# Patient Record
Sex: FEMALE | Race: OTHER | NOT HISPANIC OR LATINO | URBAN - METROPOLITAN AREA
[De-identification: names, ages, dates, MRNs, and addresses within clinical notes are randomized per-mention and may not be internally consistent; named-entity substitution may affect disease eponyms.]

---

## 2018-04-17 ENCOUNTER — INPATIENT (INPATIENT)
Facility: HOSPITAL | Age: 23
LOS: 1 days | Discharge: HOME | DRG: 641 | End: 2018-04-19
Attending: INTERNAL MEDICINE | Admitting: INTERNAL MEDICINE
Payer: COMMERCIAL

## 2018-04-17 VITALS
HEIGHT: 68 IN | SYSTOLIC BLOOD PRESSURE: 129 MMHG | OXYGEN SATURATION: 97 % | TEMPERATURE: 98 F | RESPIRATION RATE: 17 BRPM | WEIGHT: 125 LBS | HEART RATE: 97 BPM | DIASTOLIC BLOOD PRESSURE: 75 MMHG

## 2018-04-17 DIAGNOSIS — R42 DIZZINESS AND GIDDINESS: ICD-10-CM

## 2018-04-17 DIAGNOSIS — E86.0 DEHYDRATION: ICD-10-CM

## 2018-04-17 DIAGNOSIS — F41.0 PANIC DISORDER [EPISODIC PAROXYSMAL ANXIETY]: ICD-10-CM

## 2018-04-17 DIAGNOSIS — R53.1 WEAKNESS: ICD-10-CM

## 2018-04-17 DIAGNOSIS — R00.2 PALPITATIONS: ICD-10-CM

## 2018-04-17 DIAGNOSIS — R11.2 NAUSEA WITH VOMITING, UNSPECIFIED: ICD-10-CM

## 2018-04-17 LAB
ALBUMIN SERPL ELPH-MCNC: 4.7 G/DL — SIGNIFICANT CHANGE UP (ref 3.3–5)
ALP SERPL-CCNC: 56 U/L — SIGNIFICANT CHANGE UP (ref 40–120)
ALT FLD-CCNC: 16 U/L — SIGNIFICANT CHANGE UP (ref 10–45)
ANION GAP SERPL CALC-SCNC: 13 MMOL/L — SIGNIFICANT CHANGE UP (ref 5–17)
APPEARANCE UR: CLEAR — SIGNIFICANT CHANGE UP
AST SERPL-CCNC: 19 U/L — SIGNIFICANT CHANGE UP (ref 10–40)
BACTERIA # UR AUTO: ABNORMAL /HPF
BASOPHILS # BLD AUTO: 0 K/UL — SIGNIFICANT CHANGE UP (ref 0–0.2)
BASOPHILS NFR BLD AUTO: 0.1 % — SIGNIFICANT CHANGE UP (ref 0–2)
BILIRUB SERPL-MCNC: 0.7 MG/DL — SIGNIFICANT CHANGE UP (ref 0.2–1.2)
BILIRUB UR-MCNC: NEGATIVE — SIGNIFICANT CHANGE UP
BUN SERPL-MCNC: 13 MG/DL — SIGNIFICANT CHANGE UP (ref 7–23)
CALCIUM SERPL-MCNC: 9 MG/DL — SIGNIFICANT CHANGE UP (ref 8.4–10.5)
CHLORIDE SERPL-SCNC: 103 MMOL/L — SIGNIFICANT CHANGE UP (ref 96–108)
CO2 SERPL-SCNC: 23 MMOL/L — SIGNIFICANT CHANGE UP (ref 22–31)
COLOR SPEC: SIGNIFICANT CHANGE UP
CREAT SERPL-MCNC: 0.65 MG/DL — SIGNIFICANT CHANGE UP (ref 0.5–1.3)
DIFF PNL FLD: ABNORMAL
EOSINOPHIL # BLD AUTO: 0.1 K/UL — SIGNIFICANT CHANGE UP (ref 0–0.5)
EOSINOPHIL NFR BLD AUTO: 1.7 % — SIGNIFICANT CHANGE UP (ref 0–6)
EPI CELLS # UR: SIGNIFICANT CHANGE UP /HPF
GAS PNL BLDV: SIGNIFICANT CHANGE UP
GLUCOSE SERPL-MCNC: 84 MG/DL — SIGNIFICANT CHANGE UP (ref 70–99)
GLUCOSE UR QL: NEGATIVE — SIGNIFICANT CHANGE UP
HCT VFR BLD CALC: 38.2 % — SIGNIFICANT CHANGE UP (ref 34.5–45)
HGB BLD-MCNC: 13 G/DL — SIGNIFICANT CHANGE UP (ref 11.5–15.5)
KETONES UR-MCNC: NEGATIVE — SIGNIFICANT CHANGE UP
LEUKOCYTE ESTERASE UR-ACNC: NEGATIVE — SIGNIFICANT CHANGE UP
LIDOCAIN IGE QN: 46 U/L — SIGNIFICANT CHANGE UP (ref 7–60)
LYMPHOCYTES # BLD AUTO: 2.4 K/UL — SIGNIFICANT CHANGE UP (ref 1–3.3)
LYMPHOCYTES # BLD AUTO: 39.8 % — SIGNIFICANT CHANGE UP (ref 13–44)
MCHC RBC-ENTMCNC: 28.6 PG — SIGNIFICANT CHANGE UP (ref 27–34)
MCHC RBC-ENTMCNC: 34 GM/DL — SIGNIFICANT CHANGE UP (ref 32–36)
MCV RBC AUTO: 84.1 FL — SIGNIFICANT CHANGE UP (ref 80–100)
MONOCYTES # BLD AUTO: 0.5 K/UL — SIGNIFICANT CHANGE UP (ref 0–0.9)
MONOCYTES NFR BLD AUTO: 7.8 % — SIGNIFICANT CHANGE UP (ref 2–14)
NEUTROPHILS # BLD AUTO: 3 K/UL — SIGNIFICANT CHANGE UP (ref 1.8–7.4)
NEUTROPHILS NFR BLD AUTO: 50.6 % — SIGNIFICANT CHANGE UP (ref 43–77)
NITRITE UR-MCNC: NEGATIVE — SIGNIFICANT CHANGE UP
PH UR: 6.5 — SIGNIFICANT CHANGE UP (ref 5–8)
PLATELET # BLD AUTO: 247 K/UL — SIGNIFICANT CHANGE UP (ref 150–400)
POTASSIUM SERPL-MCNC: 3.8 MMOL/L — SIGNIFICANT CHANGE UP (ref 3.5–5.3)
POTASSIUM SERPL-SCNC: 3.8 MMOL/L — SIGNIFICANT CHANGE UP (ref 3.5–5.3)
PROT SERPL-MCNC: 7.4 G/DL — SIGNIFICANT CHANGE UP (ref 6–8.3)
PROT UR-MCNC: SIGNIFICANT CHANGE UP
RBC # BLD: 4.54 M/UL — SIGNIFICANT CHANGE UP (ref 3.8–5.2)
RBC # FLD: 12.1 % — SIGNIFICANT CHANGE UP (ref 10.3–14.5)
RBC CASTS # UR COMP ASSIST: SIGNIFICANT CHANGE UP /HPF (ref 0–2)
SODIUM SERPL-SCNC: 139 MMOL/L — SIGNIFICANT CHANGE UP (ref 135–145)
SP GR SPEC: 1.01 — SIGNIFICANT CHANGE UP (ref 1.01–1.02)
UROBILINOGEN FLD QL: NEGATIVE — SIGNIFICANT CHANGE UP
WBC # BLD: 5.9 K/UL — SIGNIFICANT CHANGE UP (ref 3.8–10.5)
WBC # FLD AUTO: 5.9 K/UL — SIGNIFICANT CHANGE UP (ref 3.8–10.5)
WBC UR QL: SIGNIFICANT CHANGE UP /HPF (ref 0–5)

## 2018-04-17 PROCEDURE — 99285 EMERGENCY DEPT VISIT HI MDM: CPT

## 2018-04-17 RX ORDER — ONDANSETRON 8 MG/1
8 TABLET, FILM COATED ORAL EVERY 8 HOURS
Qty: 0 | Refills: 0 | Status: DISCONTINUED | OUTPATIENT
Start: 2018-04-17 | End: 2018-04-19

## 2018-04-17 RX ORDER — ACETAMINOPHEN 500 MG
2 TABLET ORAL
Qty: 0 | Refills: 0 | COMMUNITY

## 2018-04-17 RX ORDER — ONDANSETRON 8 MG/1
4 TABLET, FILM COATED ORAL ONCE
Qty: 0 | Refills: 0 | Status: COMPLETED | OUTPATIENT
Start: 2018-04-17 | End: 2018-04-17

## 2018-04-17 RX ORDER — PREGABALIN 225 MG/1
1000 CAPSULE ORAL DAILY
Qty: 0 | Refills: 0 | Status: DISCONTINUED | OUTPATIENT
Start: 2018-04-17 | End: 2018-04-19

## 2018-04-17 RX ORDER — IBUPROFEN 200 MG
4 TABLET ORAL
Qty: 0 | Refills: 0 | COMMUNITY

## 2018-04-17 RX ORDER — TRAMADOL HYDROCHLORIDE 50 MG/1
25 TABLET ORAL ONCE
Qty: 0 | Refills: 0 | Status: DISCONTINUED | OUTPATIENT
Start: 2018-04-17 | End: 2018-04-17

## 2018-04-17 RX ORDER — SODIUM CHLORIDE 9 MG/ML
1000 INJECTION, SOLUTION INTRAVENOUS
Qty: 0 | Refills: 0 | Status: DISCONTINUED | OUTPATIENT
Start: 2018-04-17 | End: 2018-04-19

## 2018-04-17 RX ORDER — ALPRAZOLAM 0.25 MG
0.25 TABLET ORAL ONCE
Qty: 0 | Refills: 0 | Status: DISCONTINUED | OUTPATIENT
Start: 2018-04-17 | End: 2018-04-17

## 2018-04-17 RX ORDER — ALPRAZOLAM 0.25 MG
0.25 TABLET ORAL EVERY 12 HOURS
Qty: 0 | Refills: 0 | Status: DISCONTINUED | OUTPATIENT
Start: 2018-04-17 | End: 2018-04-19

## 2018-04-17 RX ORDER — MECLIZINE HCL 12.5 MG
12.5 TABLET ORAL EVERY 8 HOURS
Qty: 0 | Refills: 0 | Status: DISCONTINUED | OUTPATIENT
Start: 2018-04-17 | End: 2018-04-19

## 2018-04-17 RX ORDER — KETOROLAC TROMETHAMINE 30 MG/ML
30 SYRINGE (ML) INJECTION ONCE
Qty: 0 | Refills: 0 | Status: DISCONTINUED | OUTPATIENT
Start: 2018-04-17 | End: 2018-04-17

## 2018-04-17 RX ORDER — SODIUM CHLORIDE 9 MG/ML
1000 INJECTION INTRAMUSCULAR; INTRAVENOUS; SUBCUTANEOUS ONCE
Qty: 0 | Refills: 0 | Status: COMPLETED | OUTPATIENT
Start: 2018-04-17 | End: 2018-04-17

## 2018-04-17 RX ADMIN — Medication 0.25 MILLIGRAM(S): at 22:26

## 2018-04-17 RX ADMIN — SODIUM CHLORIDE 75 MILLILITER(S): 9 INJECTION, SOLUTION INTRAVENOUS at 23:39

## 2018-04-17 RX ADMIN — SODIUM CHLORIDE 1000 MILLILITER(S): 9 INJECTION INTRAMUSCULAR; INTRAVENOUS; SUBCUTANEOUS at 17:09

## 2018-04-17 RX ADMIN — ONDANSETRON 4 MILLIGRAM(S): 8 TABLET, FILM COATED ORAL at 17:09

## 2018-04-17 RX ADMIN — Medication 30 MILLIGRAM(S): at 17:08

## 2018-04-17 RX ADMIN — PREGABALIN 1000 MICROGRAM(S): 225 CAPSULE ORAL at 21:52

## 2018-04-17 RX ADMIN — TRAMADOL HYDROCHLORIDE 25 MILLIGRAM(S): 50 TABLET ORAL at 22:23

## 2018-04-17 RX ADMIN — TRAMADOL HYDROCHLORIDE 25 MILLIGRAM(S): 50 TABLET ORAL at 23:00

## 2018-04-17 NOTE — ED PROVIDER NOTE - OBJECTIVE STATEMENT
24 y/o F Togolese speaking, came in with weight loss of 10-15 lb in one month, low appetite, weakness fatigue and malaise. Pt states that she has been having persistent dizziness not resolved with meclizine and headache as well not resolved with OTC methods. Pt is menstruating at this time, states no correlation with menstrual cycle. Reports multiple episodes of vomiting over the course of the month associated with dizziness. No fever or chills. No vaginal discharge. No diarrhea, no recent travel.  Pt presents with PMD at bedside requesting labs, ivf, Luis Liriano [phone]

## 2018-04-17 NOTE — ED ADULT NURSE NOTE - OBJECTIVE STATEMENT
Pt c/o Pt c/o dizziness x 1 month.  Pt reports dizziness is always in the morning, then usually after eating a meal and sometimes at night.  Not associated with any particular position, laying/standing/sitting and re-positioning does not make it better.  She has Pt c/o dizziness x 1 month.  Pt reports dizziness is always in the morning, then usually after eating a meal and sometimes at night.  Not associated with any particular position, laying/standing/sitting and re-positioning does not make it better.  Happens intermittently up to 4 or so times a day, can last 15 minutes to 2 hours.  Pt has never fallen due to dizziness but has had 2 syncopal episodes over the past month.  Most recently she was standing in her kitchen making tea 2 days ago, did not have any sensation of dizziness/diaphoresis/cp/nausea, was standing feeling fine and then woke up on the floor. Pt c/o dizziness x 1 month.  Pt reports dizziness is always in the morning, then usually after eating a meal and sometimes at night.  Not associated with any particular position, laying/standing/sitting and re-positioning does not make it better.  Happens intermittently up to 4 or so times a day, can last 15 minutes to 2 hours.  Pt has never fallen due to dizziness but has had 2 syncopal episodes over the past month.  Most recently she was standing in her kitchen making tea 2 days ago, did not have any sensation of dizziness/diaphoresis/cp/nausea, was standing feeling fine and then woke up on the floor.  She states that she was menstruating when this happened and she has syncopized in the past when she was menstruating. Pt c/o dizziness x 1 month.  Pt reports dizziness is always in the morning, then usually after eating a meal and sometimes at night.  Not associated with any particular position, laying/standing/sitting and re-positioning does not make it better.  Happens intermittently up to 4 or so times a day, can last 15 minutes to 2 hours.  Pt has never fallen due to dizziness but has had 2 syncopal episodes over the past month.  Most recently she was standing in her kitchen making tea 2 days ago, did not have any sensation of dizziness/diaphoresis/cp/nausea, was standing feeling fine and then woke up on the floor.  She states that she was menstruating when this happened and she has syncopized in the past when she was menstruating.  Three weeks ago she came home from the gym and had also had syncope.  Pt also endorses that when she has dizziness she also has nausea and sensation of "heart racing."  Pt states she has not seen a neurologist for this and her friend who is a doctor here told her to come here for admission.  Pt is in no distress, abd soft/nt/nd, skin wdi, denies cp, sob, diarrhea, fevers, abd pain, numbness/tingling, headache, vision changes.  Placed on CCM for report of syncopal episodes and advised to alert staff if she needs to get up from bed.

## 2018-04-17 NOTE — H&P ADULT - NSHPLABSRESULTS_GEN_ALL_CORE
LABS:                        13.0   5.9   )-----------( 247      ( 2018 17:43 )             38.2     04-17    139  |  103  |  13  ----------------------------<  84  3.8   |  23  |  0.65    Ca    9.0      2018 17:43    TPro  7.4  /  Alb  4.7  /  TBili  0.7  /  DBili  x   /  AST  19  /  ALT  16  /  AlkPhos  56  04-17          Urinalysis Basic - ( 2018 17:43 )    Color: PL Yellow / Appearance: Clear / S.015 / pH: x  Gluc: x / Ketone: Negative  / Bili: Negative / Urobili: Negative   Blood: x / Protein: Trace / Nitrite: Negative   Leuk Esterase: Negative / RBC: 0-2 /HPF / WBC 3-5 /HPF   Sq Epi: x / Non Sq Epi: OCC /HPF / Bacteria: Few /HPF      CAPILLARY BLOOD GLUCOSE            RADIOLOGY & ADDITIONAL STUDIES:

## 2018-04-17 NOTE — ED PROVIDER NOTE - MEDICAL DECISION MAKING DETAILS
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 Will obtain labs with tsh, orthostatics, urine preg, urinalysis, give IVF zofran and toradol for headache and reevaluate. VERONIKA. 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 PE unremarakble. Will obtain labs with tsh, orthostatics, urine preg, urinalysis, give IVF zofran and toradol for headache and reevaluate. VERONIKA.

## 2018-04-17 NOTE — H&P ADULT - NSHPPHYSICALEXAM_GEN_ALL_CORE
Vital Signs Last 24 Hrs  T(C): 37 (17 Apr 2018 19:00), Max: 37 (17 Apr 2018 19:00)  T(F): 98.6 (17 Apr 2018 19:00), Max: 98.6 (17 Apr 2018 19:00)  HR: 76 (17 Apr 2018 19:00) (59 - 97)  BP: 116/96 (17 Apr 2018 19:00) (106/72 - 129/75)  BP(mean): --  RR: 18 (17 Apr 2018 19:00) (17 - 18)  SpO2: 98% (17 Apr 2018 19:00) (97% - 98%)    Constitutional: ill appearance, NAD  HEENT: dry oral mucosa. NC, AT  Neck:  Supple. No JVD, bruits or thyromegaly  Respiratory:  CTA b/l. No wheezing, rhonchi, or rales. Not using accessory muscles of respiration.   Cardiovascular: bradycardia. regular rhythm. No R/G. 2+ radial pulses b/l.   Gastrointestinal: Soft, NT, ND, normoactive bowel sounds.  No organomegaly, rigidity, or RT.  Extremities: poor skin turgor. WWP without cyanosis, clubbing or edema.  Neurological:  Alert and awake.  Crude sensation intact.  No acute motor deficits.

## 2018-04-17 NOTE — H&P ADULT - HISTORY OF PRESENT ILLNESS
24 yo F from home with intermittent hx of Iron Deficiency Anemia who p/w c/o 1 month progressively worsening generalized weakness a/w nausea, nbnb emesis, poor oral intake, ~10-15 lb unintentional weight loss, dizziness, vague abdominal pain, headaches, and palpitations.  Failed a couple doses of po zofran and meclizine, however questionable intake as pt had emesis.  No aggravating or improving factors.

## 2018-04-17 NOTE — H&P ADULT - ASSESSMENT
24 yo F from home with intermittent hx of Iron Deficiency Anemia who p/w c/o 1 month progressively worsening generalized weakness a/w nausea, nbnb emesis, poor oral intake, ~10-15 lb unintentional weight loss, dizziness, vague abdominal pain, headaches, and palpitations.

## 2018-04-17 NOTE — H&P ADULT - PROBLEM SELECTOR PLAN 4
ekg sinus rosana  potentially 2/2 dehydration vs panic attack vs other etiology  c/w hydration  xanax prn  monitor closely  cardio on board

## 2018-04-17 NOTE — H&P ADULT - NSHPREVIEWOFSYSTEMS_GEN_ALL_CORE
REVIEW OF SYSTEMS: as above and...  CONSTITUTIONAL: (+) Malaise, unintentional weight loss  EYES: No acute change in vision.  ENT:  No difficulty hearing, tinnitus, vertigo  NECK: No pain or stiffness  RESPIRATORY: No cough, wheezing, hemoptysis, or shortness of breath  CARDIOVASCULAR: palpitations. No chest pain, syncope, or leg swelling  GASTROINTESTINAL: poor oral intake, abdominal pain, N/V.  No hematemesis, diarrhea, melena, or hematochezia.  GENITOURINARY: No dysuria, frequency, hematuria  NEUROLOGICAL: Headaches.  No acute loss of strength, numbness, or tremors  SKIN: No rashes or lesions   LYMPH NODES: No enlarged glands  ENDOCRINE: No heat or cold intolerance  MUSCULOSKELETAL: No arthralgia, myalgia   PSYCHIATRIC: No suicidal or homicidal ideations  HEME/LYMPH: No easy bruising or bleeding   ALLERGY AND IMMUNOLOGIC: No hives or eczema

## 2018-04-17 NOTE — ED PROVIDER NOTE - NS ED ROS FT
Constitutional: No fever or chills  Eyes: No visual changes, eye pain or redness  HEENT: No throat pain, ear pain, nasal pain. No nose bleeding.  CV: No chest pain or lower extremity edema  Resp: No SOB no cough  GI: No abd pain. +nausea or vomiting. No diarrhea. No constipation.   : No dysuria, hematuria.   MSK: No musculoskeletal pain  Skin: No rash  Neuro: +headache. No numbness or tingling. No weakness. +dizziness

## 2018-04-18 DIAGNOSIS — N92.6 IRREGULAR MENSTRUATION, UNSPECIFIED: ICD-10-CM

## 2018-04-18 DIAGNOSIS — G44.209 TENSION-TYPE HEADACHE, UNSPECIFIED, NOT INTRACTABLE: ICD-10-CM

## 2018-04-18 LAB
24R-OH-CALCIDIOL SERPL-MCNC: 15 NG/ML — LOW (ref 30–80)
ALBUMIN SERPL ELPH-MCNC: 3.7 G/DL — SIGNIFICANT CHANGE UP (ref 3.3–5)
ALP SERPL-CCNC: 45 U/L — SIGNIFICANT CHANGE UP (ref 40–120)
ALT FLD-CCNC: 18 U/L — SIGNIFICANT CHANGE UP (ref 10–45)
ANION GAP SERPL CALC-SCNC: 11 MMOL/L — SIGNIFICANT CHANGE UP (ref 5–17)
APTT BLD: 31.5 SEC — SIGNIFICANT CHANGE UP (ref 27.5–37.4)
AST SERPL-CCNC: 15 U/L — SIGNIFICANT CHANGE UP (ref 10–40)
BASOPHILS # BLD AUTO: 0.01 K/UL — SIGNIFICANT CHANGE UP (ref 0–0.2)
BASOPHILS NFR BLD AUTO: 0.2 % — SIGNIFICANT CHANGE UP (ref 0–2)
BILIRUB DIRECT SERPL-MCNC: 0.1 MG/DL — SIGNIFICANT CHANGE UP (ref 0–0.2)
BILIRUB INDIRECT FLD-MCNC: 0.6 MG/DL — SIGNIFICANT CHANGE UP (ref 0.2–1)
BILIRUB SERPL-MCNC: 0.7 MG/DL — SIGNIFICANT CHANGE UP (ref 0.2–1.2)
BUN SERPL-MCNC: 15 MG/DL — SIGNIFICANT CHANGE UP (ref 7–23)
CALCIUM SERPL-MCNC: 8.6 MG/DL — SIGNIFICANT CHANGE UP (ref 8.4–10.5)
CHLORIDE SERPL-SCNC: 105 MMOL/L — SIGNIFICANT CHANGE UP (ref 96–108)
CHOLEST SERPL-MCNC: 95 MG/DL — SIGNIFICANT CHANGE UP (ref 10–199)
CK SERPL-CCNC: 94 U/L — SIGNIFICANT CHANGE UP (ref 25–170)
CO2 SERPL-SCNC: 24 MMOL/L — SIGNIFICANT CHANGE UP (ref 22–31)
CREAT SERPL-MCNC: 0.69 MG/DL — SIGNIFICANT CHANGE UP (ref 0.5–1.3)
CULTURE RESULTS: SIGNIFICANT CHANGE UP
EOSINOPHIL # BLD AUTO: 0.18 K/UL — SIGNIFICANT CHANGE UP (ref 0–0.5)
EOSINOPHIL NFR BLD AUTO: 3 % — SIGNIFICANT CHANGE UP (ref 0–6)
ERYTHROCYTE [SEDIMENTATION RATE] IN BLOOD: 2 MM/HR — SIGNIFICANT CHANGE UP (ref 0–15)
GLUCOSE SERPL-MCNC: 98 MG/DL — SIGNIFICANT CHANGE UP (ref 70–99)
HBA1C BLD-MCNC: 5.4 % — SIGNIFICANT CHANGE UP (ref 4–5.6)
HCG UR QL: NEGATIVE — SIGNIFICANT CHANGE UP
HCT VFR BLD CALC: 34.9 % — SIGNIFICANT CHANGE UP (ref 34.5–45)
HDLC SERPL-MCNC: 37 MG/DL — LOW (ref 40–125)
HGB BLD-MCNC: 11.4 G/DL — LOW (ref 11.5–15.5)
IMM GRANULOCYTES NFR BLD AUTO: 0.2 % — SIGNIFICANT CHANGE UP (ref 0–1.5)
INR BLD: 0.99 RATIO — SIGNIFICANT CHANGE UP (ref 0.88–1.16)
IRON SATN MFR SERPL: 13 % — LOW (ref 14–50)
IRON SATN MFR SERPL: 42 UG/DL — SIGNIFICANT CHANGE UP (ref 30–160)
LDH SERPL L TO P-CCNC: 144 U/L — SIGNIFICANT CHANGE UP (ref 50–242)
LIPID PNL WITH DIRECT LDL SERPL: 48 MG/DL — SIGNIFICANT CHANGE UP
LYMPHOCYTES # BLD AUTO: 2.88 K/UL — SIGNIFICANT CHANGE UP (ref 1–3.3)
LYMPHOCYTES # BLD AUTO: 47.4 % — HIGH (ref 13–44)
MAGNESIUM SERPL-MCNC: 2 MG/DL — SIGNIFICANT CHANGE UP (ref 1.6–2.6)
MCHC RBC-ENTMCNC: 27.1 PG — SIGNIFICANT CHANGE UP (ref 27–34)
MCHC RBC-ENTMCNC: 32.7 GM/DL — SIGNIFICANT CHANGE UP (ref 32–36)
MCV RBC AUTO: 83.1 FL — SIGNIFICANT CHANGE UP (ref 80–100)
MONOCYTES # BLD AUTO: 0.4 K/UL — SIGNIFICANT CHANGE UP (ref 0–0.9)
MONOCYTES NFR BLD AUTO: 6.6 % — SIGNIFICANT CHANGE UP (ref 2–14)
NEUTROPHILS # BLD AUTO: 2.6 K/UL — SIGNIFICANT CHANGE UP (ref 1.8–7.4)
NEUTROPHILS NFR BLD AUTO: 42.6 % — LOW (ref 43–77)
PHOSPHATE SERPL-MCNC: 4.3 MG/DL — SIGNIFICANT CHANGE UP (ref 2.5–4.5)
PLATELET # BLD AUTO: 241 K/UL — SIGNIFICANT CHANGE UP (ref 150–400)
POTASSIUM SERPL-MCNC: 4.3 MMOL/L — SIGNIFICANT CHANGE UP (ref 3.5–5.3)
POTASSIUM SERPL-SCNC: 4.3 MMOL/L — SIGNIFICANT CHANGE UP (ref 3.5–5.3)
PROT SERPL-MCNC: 6 G/DL — SIGNIFICANT CHANGE UP (ref 6–8.3)
PROTHROM AB SERPL-ACNC: 11.2 SEC — SIGNIFICANT CHANGE UP (ref 10–13.1)
RBC # BLD: 4.2 M/UL — SIGNIFICANT CHANGE UP (ref 3.8–5.2)
RBC # FLD: 14 % — SIGNIFICANT CHANGE UP (ref 10.3–14.5)
SODIUM SERPL-SCNC: 140 MMOL/L — SIGNIFICANT CHANGE UP (ref 135–145)
SPECIMEN SOURCE: SIGNIFICANT CHANGE UP
TIBC SERPL-MCNC: 312 UG/DL — SIGNIFICANT CHANGE UP (ref 220–430)
TOTAL CHOLESTEROL/HDL RATIO MEASUREMENT: 2.6 RATIO — LOW (ref 3.3–7.1)
TRIGL SERPL-MCNC: 51 MG/DL — SIGNIFICANT CHANGE UP (ref 10–149)
TSH SERPL-MCNC: 0.83 UIU/ML — SIGNIFICANT CHANGE UP (ref 0.27–4.2)
UIBC SERPL-MCNC: 270 UG/DL — SIGNIFICANT CHANGE UP (ref 110–370)
URATE SERPL-MCNC: 3.1 MG/DL — SIGNIFICANT CHANGE UP (ref 2.5–7)
WBC # BLD: 6.08 K/UL — SIGNIFICANT CHANGE UP (ref 3.8–10.5)
WBC # FLD AUTO: 6.08 K/UL — SIGNIFICANT CHANGE UP (ref 3.8–10.5)

## 2018-04-18 PROCEDURE — 76856 US EXAM PELVIC COMPLETE: CPT | Mod: 26

## 2018-04-18 PROCEDURE — 76830 TRANSVAGINAL US NON-OB: CPT | Mod: 26,59

## 2018-04-18 RX ORDER — SOD SULF/SODIUM/NAHCO3/KCL/PEG
4000 SOLUTION, RECONSTITUTED, ORAL ORAL ONCE
Qty: 0 | Refills: 0 | Status: DISCONTINUED | OUTPATIENT
Start: 2018-04-18 | End: 2018-04-18

## 2018-04-18 RX ORDER — ACETAMINOPHEN 500 MG
650 TABLET ORAL EVERY 6 HOURS
Qty: 0 | Refills: 0 | Status: DISCONTINUED | OUTPATIENT
Start: 2018-04-18 | End: 2018-04-19

## 2018-04-18 RX ADMIN — SODIUM CHLORIDE 75 MILLILITER(S): 9 INJECTION, SOLUTION INTRAVENOUS at 21:57

## 2018-04-18 RX ADMIN — Medication 650 MILLIGRAM(S): at 10:04

## 2018-04-18 RX ADMIN — Medication 650 MILLIGRAM(S): at 11:04

## 2018-04-18 RX ADMIN — PREGABALIN 1000 MICROGRAM(S): 225 CAPSULE ORAL at 11:38

## 2018-04-18 NOTE — PROGRESS NOTE ADULT - ASSESSMENT
22 yo F from home with intermittent hx of Iron Deficiency Anemia who p/w c/o 1 month progressively worsening generalized weakness a/w nausea, nbnb emesis, poor oral intake, ~10-15 lb unintentional weight loss, dizziness, vague abdominal pain, headaches, and palpitations. 24 yo F from home with intermittent hx of Iron Deficiency Anemia who p/w c/o 1 month progressively worsening generalized weakness a/w nausea, nbnb emesis, poor oral intake, ~10-15 lb unintentional weight loss, dizziness, vague abdominal pain, headaches, and palpitations    - Dehdyration: improving, c/w ivf and oral hydration as tolerated  - Irregular Menses: f/u urine pregnancy and pelvic u/s   - Tension H/a: analgesics prn   - Anxiety Attack: xanax prn   - Palpitations: improving with hydration and xanax   - Dizziness: Meclizine prn   - N/V: Zofran prn

## 2018-04-18 NOTE — PROGRESS NOTE ADULT - SUBJECTIVE AND OBJECTIVE BOX
Patient seen and examined at bedside  Case discussed with medical team    HPI:  24 yo F from home with intermittent hx of Iron Deficiency Anemia who p/w c/o 1 month progressively worsening generalized weakness a/w nausea, nbnb emesis, poor oral intake, ~10-15 lb unintentional weight loss, dizziness, vague abdominal pain, headaches, and palpitations.  Failed a couple doses of po zofran and meclizine, however questionable intake as pt had emesis.  No aggravating or improving factors. (2018 17:31)      MEDICATIONS  (STANDING):  cyanocobalamin Injectable 1000 MICROGram(s) IntraMuscular daily  sodium chloride 0.9% 1000 milliLiter(s) (75 mL/Hr) IV Continuous <Continuous>    MEDICATIONS  (PRN):  ALPRAZolam 0.25 milliGRAM(s) Oral every 12 hours PRN anxiety/panic attack  aluminum hydroxide/magnesium hydroxide/simethicone Suspension 30 milliLiter(s) Oral every 6 hours PRN Dyspepsia  meclizine 12.5 milliGRAM(s) Oral every 8 hours PRN Dizziness  ondansetron Injectable 8 milliGRAM(s) IV Push every 8 hours PRN Nausea and/or Vomiting      REVIEW OF SYSTEMS:  CONSTITUTIONAL: (+) malaise.   EYES: No acute change in vision   ENT:  No tinnitus  NECK: No stiffness  RESPIRATORY: No hemoptysis  CARDIOVASCULAR: No chest pain, palpitations, syncope  GASTROINTESTINAL: No hematemesis, diarrhea, melena, or hematochezia.  GENITOURINARY: No hematuria  NEUROLOGICAL: No headaches  LYMPH Nodes: No enlarged glands  ENDOCRINE: No heat or cold intolerance	    T(C): 36.5 (18 @ 03:58), Max: 37 (18 @ 19:00)  HR: 61 (18 @ 03:58) (59 - 97)  BP: 91/62 (18 @ 03:58) (91/62 - 129/75)  RR: 17 (18 @ 03:58) (17 - 18)  SpO2: 99% (18 @ 03:58) (97% - 100%)    PHYSICAL EXAMINATION:   Constitutional: NAD  HEENT: NC, AT  Neck:  Supple  Respiratory:  Adequate airflow b/l. Not using accessory muscles of respiration.  Cardiovascular:  S1 & S2 intact, no R/G, 2+ radial pulses b/l  Gastrointestinal: Soft, ND, normoactive b.s., no organomegaly/RT/rigidity  Extremities: WWP  Neurological:  Alert and awake.  No acute focal motor deficits. Crude sensation intact.     Labs and imaging reviewed    LABS:                        11.4   6.08  )-----------( 241      ( 2018 07:35 )             34.9     04-18    140  |  105  |  15  ----------------------------<  98  4.3   |  24  |  0.69    Ca    8.6      2018 05:51  Phos  4.3     04-18  Mg     2.0     04-18    TPro  6.0  /  Alb  3.7  /  TBili  0.7  /  DBili  0.1  /  AST  15  /  ALT  18  /  AlkPhos  45  04-18    CARDIAC MARKERS ( 2018 05:51 )  x     / x     / 94 U/L / x     / x          PT/INR - ( 2018 07:33 )   PT: 11.2 sec;   INR: 0.99 ratio         PTT - ( 2018 07:33 )  PTT:31.5 sec  Urinalysis Basic - ( 2018 17:43 )    Color: PL Yellow / Appearance: Clear / S.015 / pH: x  Gluc: x / Ketone: Negative  / Bili: Negative / Urobili: Negative   Blood: x / Protein: Trace / Nitrite: Negative   Leuk Esterase: Negative / RBC: 0-2 /HPF / WBC 3-5 /HPF   Sq Epi: x / Non Sq Epi: OCC /HPF / Bacteria: Few /HPF      CAPILLARY BLOOD GLUCOSE            LIVER FUNCTIONS - ( 2018 05:51 )  Alb: 3.7 g/dL / Pro: 6.0 g/dL / ALK PHOS: 45 U/L / ALT: 18 U/L / AST: 15 U/L / GGT: x               RADIOLOGY & ADDITIONAL STUDIES:

## 2018-04-19 VITALS
RESPIRATION RATE: 18 BRPM | DIASTOLIC BLOOD PRESSURE: 86 MMHG | TEMPERATURE: 98 F | SYSTOLIC BLOOD PRESSURE: 119 MMHG | HEART RATE: 74 BPM | OXYGEN SATURATION: 100 %

## 2018-04-19 PROCEDURE — 93306 TTE W/DOPPLER COMPLETE: CPT | Mod: 26

## 2018-04-19 RX ORDER — ASCORBIC ACID 60 MG
0 TABLET,CHEWABLE ORAL
Qty: 0 | Refills: 0 | COMMUNITY

## 2018-04-19 RX ORDER — ASCORBIC ACID 60 MG
1 TABLET,CHEWABLE ORAL
Qty: 0 | Refills: 0 | COMMUNITY

## 2018-04-19 RX ORDER — ALPRAZOLAM 0.25 MG
1 TABLET ORAL
Qty: 10 | Refills: 0 | OUTPATIENT
Start: 2018-04-19 | End: 2018-04-23

## 2018-04-19 RX ORDER — VITAMIN E 100 UNIT
0 CAPSULE ORAL
Qty: 0 | Refills: 0 | COMMUNITY

## 2018-04-19 RX ORDER — MECLIZINE HCL 12.5 MG
1 TABLET ORAL
Qty: 30 | Refills: 0 | OUTPATIENT
Start: 2018-04-19 | End: 2018-04-28

## 2018-04-19 RX ORDER — VITAMIN E 100 UNIT
1 CAPSULE ORAL
Qty: 0 | Refills: 0 | COMMUNITY

## 2018-04-19 RX ADMIN — PREGABALIN 1000 MICROGRAM(S): 225 CAPSULE ORAL at 13:19

## 2018-04-19 NOTE — DISCHARGE NOTE ADULT - PLAN OF CARE
resolved Tylenol/Advil as needed xanax as needed Resolved  Stay well hydrated You had a normal pelvic ultrasound  Follow up with GYN no dizziness meclizine as needed xanax as needed for anxiety  stay well hydrated

## 2018-04-19 NOTE — DISCHARGE NOTE ADULT - PATIENT PORTAL LINK FT
You can access the [facility] Portal, offered by [facility], by registering with the following website: [email or web address]

## 2018-04-19 NOTE — DISCHARGE NOTE ADULT - HOSPITAL COURSE
22 yo F from home with intermittent hx of Iron Deficiency Anemia who p/w c/o 1 month progressively worsening generalized weakness a/w nausea, nbnb emesis, poor oral intake, ~10-15 lb unintentional weight loss, dizziness, vague abdominal pain, headaches, and palpitations.       Problem/Plan - 1:  ·  Problem: Chest pain  Plan:  atypical, TTE to assess EF and valves     Problem/Plan - 2:  ·  Problem: Weakness generalized.  Plan: c/w hydration, f/u labs.      Problem/Plan - 3:  ·  Problem: Nausea & vomiting.  Plan: zofran prn.      Problem/Plan - 4:  ·  Problem: Palpitations.  Plan: ekg sinus rosana  potentially 2/2 dehydration/anemia vs anxiety  c/w hydration  xanax prn  if recurrent episodes consider Tx to tele to rule out occult arrythmia    a/w nausea, nbnb emesis, poor oral intake, ~10-15 lb unintentional weight loss, dizziness, vague abdominal pain, headaches, and palpitations    - Dehdyration: improving, c/w ivf and oral hydration as tolerated  - Irregular Menses: f/u urine pregnancy and pelvic u/s   - Tension H/a: analgesics prn   - Anxiety Attack: xanax prn   - Palpitations: improving with hydration and xanax   - Dizziness: Meclizine prn   - N/V: Zofran prn 24 yo F from home with intermittent hx of Iron Deficiency Anemia who p/w c/o 1 month progressively worsening generalized weakness a/w nausea, nbnb emesis, poor oral intake, ~10-15 lb unintentional weight loss, dizziness, vague abdominal pain, headaches, and palpitations.  Admitted to medicine;      Problem/Plan - 1:  ·  Problem: Chest pain  Plan:  atypical, TTE to assess EF and valves     Problem/Plan - 2:  ·  Problem: Weakness generalized.  Plan: c/w hydration, f/u labs.      Problem/Plan - 3:  ·  Problem: Nausea & vomiting.  Plan: zofran prn.      Problem/Plan - 4:  ·  Problem: Palpitations.  Plan: ekg sinus rosana  potentially 2/2 dehydration/anemia vs anxiety  c/w hydration  xanax prn  if recurrent episodes consider Tx to tele to rule out occult arrythmia    a/w nausea, nbnb emesis, poor oral intake, ~10-15 lb unintentional weight loss, dizziness, vague abdominal pain, headaches, and palpitations    - Dehdyration: improving, c/w ivf and oral hydration as tolerated  - Irregular Menses: f/u urine pregnancy and pelvic u/s   - Tension H/a: analgesics prn   - Anxiety Attack: xanax prn   - Palpitations: improving with hydration and xanax   - Dizziness: Meclizine prn   - N/V: Zofran prn 23 yr old F a/w nausea, nbnb emesis, poor oral intake, ~10-15 lb unintentional weight loss, dizziness, vague abdominal pain, headaches, and palpitations  Admitted to medicine with dehydration; ;  Cardiology consulted for palpitations: potentially 2/2 dehydration/anemia vs anxiety, improving with hydration and xanax ; ekg sinus rosana;  Pt with Iron deficiency anemia and Irregular Menses: pelvic u/s with small right-sided corpus luteum. Otherwise unremarkable study   Discharged home

## 2018-04-19 NOTE — DISCHARGE NOTE ADULT - CARE PLAN
Principal Discharge DX:	Dizziness  Secondary Diagnosis:	Palpitations  Secondary Diagnosis:	Weakness generalized  Secondary Diagnosis:	Tension headache  Secondary Diagnosis:	Panic attack  Secondary Diagnosis:	Dehydration  Secondary Diagnosis:	Irregular menses Principal Discharge DX:	Dizziness  Goal:	no dizziness  Assessment and plan of treatment:	meclizine as needed  Secondary Diagnosis:	Palpitations  Assessment and plan of treatment:	xanax as needed for anxiety  stay well hydrated  Secondary Diagnosis:	Weakness generalized  Assessment and plan of treatment:	resolved  Secondary Diagnosis:	Tension headache  Assessment and plan of treatment:	Tylenol/Advil as needed  Secondary Diagnosis:	Panic attack  Assessment and plan of treatment:	xanax as needed  Secondary Diagnosis:	Dehydration  Assessment and plan of treatment:	Resolved  Stay well hydrated  Secondary Diagnosis:	Irregular menses  Assessment and plan of treatment:	You had a normal pelvic ultrasound  Follow up with GYN

## 2018-04-19 NOTE — DISCHARGE NOTE ADULT - MEDICATION SUMMARY - MEDICATIONS TO TAKE
I will START or STAY ON the medications listed below when I get home from the hospital:    Advil 200 mg oral tablet  -- 4 tab(s) by mouth once a day, As Needed  -- Indication: For Pain    Tylenol 325 mg oral tablet  -- 2 tab(s) by mouth once a day, As Needed  -- Indication: For Pain    meclizine 12.5 mg oral tablet  -- 1 tab(s) by mouth every 8 hours, As needed, Dizziness  -- Indication: For Dizziness    ALPRAZolam 0.25 mg oral tablet  -- 1 tab(s) by mouth every 12 hours, As needed, anxiety/panic attack MDD:2  -- Indication: For anxiety    vitamin E  -- 1 tab(s) by mouth once a day  -- Indication: For supplement    Vitamin C  -- 1 tab(s) by mouth once a day  -- Indication: For supplement

## 2018-04-20 LAB
FERRITIN SERPL-MCNC: 14 NG/ML — LOW (ref 15–150)
FOLATE SERPL-MCNC: >20 NG/ML — SIGNIFICANT CHANGE UP
TSH SERPL-MCNC: 1.55 UIU/ML — SIGNIFICANT CHANGE UP (ref 0.27–4.2)
VIT B12 SERPL-MCNC: >2000 PG/ML — HIGH (ref 232–1245)

## 2018-04-22 LAB — PYRIDOXAL PHOS SERPL-MCNC: 29.6 UG/L — SIGNIFICANT CHANGE UP (ref 2–32.8)

## 2018-05-23 PROCEDURE — 82330 ASSAY OF CALCIUM: CPT

## 2018-05-23 PROCEDURE — 83036 HEMOGLOBIN GLYCOSYLATED A1C: CPT

## 2018-05-23 PROCEDURE — 93306 TTE W/DOPPLER COMPLETE: CPT

## 2018-05-23 PROCEDURE — 84100 ASSAY OF PHOSPHORUS: CPT

## 2018-05-23 PROCEDURE — 80076 HEPATIC FUNCTION PANEL: CPT

## 2018-05-23 PROCEDURE — 83605 ASSAY OF LACTIC ACID: CPT

## 2018-05-23 PROCEDURE — 81001 URINALYSIS AUTO W/SCOPE: CPT

## 2018-05-23 PROCEDURE — 84443 ASSAY THYROID STIM HORMONE: CPT

## 2018-05-23 PROCEDURE — 84132 ASSAY OF SERUM POTASSIUM: CPT

## 2018-05-23 PROCEDURE — 96374 THER/PROPH/DIAG INJ IV PUSH: CPT

## 2018-05-23 PROCEDURE — 84295 ASSAY OF SERUM SODIUM: CPT

## 2018-05-23 PROCEDURE — 82607 VITAMIN B-12: CPT

## 2018-05-23 PROCEDURE — 82947 ASSAY GLUCOSE BLOOD QUANT: CPT

## 2018-05-23 PROCEDURE — 82435 ASSAY OF BLOOD CHLORIDE: CPT

## 2018-05-23 PROCEDURE — 80053 COMPREHEN METABOLIC PANEL: CPT

## 2018-05-23 PROCEDURE — 76830 TRANSVAGINAL US NON-OB: CPT

## 2018-05-23 PROCEDURE — 84207 ASSAY OF VITAMIN B-6: CPT

## 2018-05-23 PROCEDURE — 85027 COMPLETE CBC AUTOMATED: CPT

## 2018-05-23 PROCEDURE — 85610 PROTHROMBIN TIME: CPT

## 2018-05-23 PROCEDURE — 96375 TX/PRO/DX INJ NEW DRUG ADDON: CPT

## 2018-05-23 PROCEDURE — 84550 ASSAY OF BLOOD/URIC ACID: CPT

## 2018-05-23 PROCEDURE — 80061 LIPID PANEL: CPT

## 2018-05-23 PROCEDURE — 83615 LACTATE (LD) (LDH) ENZYME: CPT

## 2018-05-23 PROCEDURE — G0378: CPT

## 2018-05-23 PROCEDURE — 99285 EMERGENCY DEPT VISIT HI MDM: CPT | Mod: 25

## 2018-05-23 PROCEDURE — 87086 URINE CULTURE/COLONY COUNT: CPT

## 2018-05-23 PROCEDURE — 85652 RBC SED RATE AUTOMATED: CPT

## 2018-05-23 PROCEDURE — 82728 ASSAY OF FERRITIN: CPT

## 2018-05-23 PROCEDURE — 83735 ASSAY OF MAGNESIUM: CPT

## 2018-05-23 PROCEDURE — 76856 US EXAM PELVIC COMPLETE: CPT

## 2018-05-23 PROCEDURE — 85014 HEMATOCRIT: CPT

## 2018-05-23 PROCEDURE — 83550 IRON BINDING TEST: CPT

## 2018-05-23 PROCEDURE — 82550 ASSAY OF CK (CPK): CPT

## 2018-05-23 PROCEDURE — 82746 ASSAY OF FOLIC ACID SERUM: CPT

## 2018-05-23 PROCEDURE — 82306 VITAMIN D 25 HYDROXY: CPT

## 2018-05-23 PROCEDURE — 80048 BASIC METABOLIC PNL TOTAL CA: CPT

## 2018-05-23 PROCEDURE — 85730 THROMBOPLASTIN TIME PARTIAL: CPT

## 2018-05-23 PROCEDURE — 82803 BLOOD GASES ANY COMBINATION: CPT

## 2018-05-23 PROCEDURE — 81025 URINE PREGNANCY TEST: CPT

## 2018-05-23 PROCEDURE — 83690 ASSAY OF LIPASE: CPT

## 2019-11-21 NOTE — DISCHARGE NOTE ADULT - NS AS DC PROVIDER CONTACT Y/N MULTI
no loss of consciousness, no gait abnormality, no headache, no sensory deficits, and no weakness.
Yes

## 2020-05-22 NOTE — DISCHARGE NOTE ADULT - PHYSICIAN SECTION COMPLETE
BOTOX for COSMETIC (50 units): The patient presented with multiple facial rhytids after informed consent the patient was treated with Botox at a dosage documented on Integreview in this chart. The patient tolerated the procedure well. Yes

## 2020-09-10 ENCOUNTER — TRANSCRIPTION ENCOUNTER (OUTPATIENT)
Age: 25
End: 2020-09-10

## 2022-01-31 ENCOUNTER — TRANSCRIPTION ENCOUNTER (OUTPATIENT)
Age: 27
End: 2022-01-31

## 2022-04-20 ENCOUNTER — TRANSCRIPTION ENCOUNTER (OUTPATIENT)
Age: 27
End: 2022-04-20

## 2022-04-25 ENCOUNTER — TRANSCRIPTION ENCOUNTER (OUTPATIENT)
Age: 27
End: 2022-04-25

## 2022-12-15 ENCOUNTER — EMERGENCY (EMERGENCY)
Facility: HOSPITAL | Age: 27
LOS: 1 days | Discharge: HOME | End: 2022-12-15
Attending: EMERGENCY MEDICINE
Payer: MEDICAID

## 2022-12-15 VITALS
RESPIRATION RATE: 18 BRPM | DIASTOLIC BLOOD PRESSURE: 78 MMHG | OXYGEN SATURATION: 97 % | SYSTOLIC BLOOD PRESSURE: 117 MMHG | WEIGHT: 134.04 LBS | TEMPERATURE: 98 F | HEART RATE: 75 BPM

## 2022-12-15 PROCEDURE — 99283 EMERGENCY DEPT VISIT LOW MDM: CPT

## 2022-12-15 RX ORDER — IPRATROPIUM BROMIDE 21 MCG
2 AEROSOL, SPRAY (ML) NASAL
Qty: 1 | Refills: 0
Start: 2022-12-15 | End: 2022-12-18

## 2022-12-15 RX ORDER — DEXTROMETHORPHAN HYDROBROMIDE AND PROMETHAZINE HYDROCHLORIDE 15; 6.25 MG/5ML; MG/5ML
5 SYRUP ORAL
Qty: 100 | Refills: 0
Start: 2022-12-15 | End: 2022-12-19

## 2022-12-15 RX ORDER — IBUPROFEN 200 MG
1 TABLET ORAL
Qty: 20 | Refills: 0
Start: 2022-12-15 | End: 2022-12-19

## 2022-12-15 NOTE — ED ADULT NURSE NOTE - NS ED PATIENT SAFETY CONCERN
Vital Signs Last 24 Hrs  T(C): 37 (30 Jun 2022 22:41), Max: 37 (30 Jun 2022 22:41)  T(F): 98.6 (30 Jun 2022 22:41), Max: 98.6 (30 Jun 2022 22:41)  HR: 81 (30 Jun 2022 23:17) (72 - 94)  BP: 104/62 (30 Jun 2022 22:44) (104/62 - 111/66)  BP(mean): --  RR: 18 (30 Jun 2022 22:41) (18 - 18)  SpO2: 97% (30 Jun 2022 23:17) (92% - 99%)    Physical Exam:  Gen: NAD, AOx3  CV: RR  Resp: unlabored respirations  Abd: soft, NT, ND    SVE: 1/0/-3  FHT: 150s, moderate variability, accels, no decels   [place]: uterine irritability   EFW: 2627g  Sono: vertex No

## 2022-12-15 NOTE — ED PROVIDER NOTE - CARDIAC, MLM
Normal rate, regular rhythm.  Heart sounds S1, S2.  No murmurs, rubs or gallops. Reproducible chest pain with palpation.

## 2022-12-15 NOTE — ED ADULT NURSE NOTE - OBJECTIVE STATEMENT
Pt presents to the ED with c/o cough x 1 week and chest pain when coughing. Denies fever, difficulty breathing, or vomiting.

## 2022-12-15 NOTE — ED ADULT TRIAGE NOTE - AS TEMP SITE
Patient : Xiomara Skinner Age: 63 year old Sex: female   MRN: [id number] Encounter Date: 9/26/2021      History     Chief Complaint   Patient presents with   • Fever 8 weeks or less   • Post-op Problem     64 y/o female w/ PMH of lipoma removed 2 weeks ago, now has fever, abd pain,upper and lower, nausea, no V/D, no back pain, no cough, no other complaints.          Allergies   Allergen Reactions   • Codeine DIZZINESS   • Advair Diskus HIVES   • Breo Ellipta HIVES   • Ciprofloxacin HIVES   • Metronidazole Other (See Comments)     \"made me sick\"       Current Discharge Medication List      Prior to Admission Medications    Details   tiotropium (Spiriva HandiHaler) 18 MCG capsule for inhaler Place 1 capsule into inhaler and inhale daily.       fluticasone (FLONASE) 50 MCG/ACT nasal spray 2 SPRAYS INTO EACH NOSTRIL ONCE A DAY.      Ventolin  (90 Base) MCG/ACT inhaler 2 puffs every 4 hours as needed.      predniSONE (DELTASONE) 10 MG tablet TAKE 1 TABLET BY MOUTH EVERY DAY FOR 90 DAYS.      Budesonide-Formoterol Fumarate (SYMBICORT IN) Inhale 2 puffs into the lungs 2 times daily.       ibuprofen (MOTRIN) 800 MG tablet 1 tablet.      levothyroxine (SYNTHROID, LEVOTHROID) 112 MCG tablet 0.1 mg.             Past Medical History:   Diagnosis Date   • Abdominal pain    • Chronic kidney disease    • COPD (chronic obstructive pulmonary disease) (CMS/HCC)    • Fracture    • Irritable bowel    • Kidney stones    • Nausea & vomiting    • Pneumonia    • Rash     on abdomen. Itching for 5 months   • Thyroid disease    • Ulcerative colitis (CMS/HCC)    • Uterine cancer (CMS/HCC)    • Weight loss        Past Surgical History:   Procedure Laterality Date   • CERVICAL DISC SURGERY     • COLON SURGERY     • HEMORRHOIDECTOMY     • HERNIA REPAIR      x2   • PARTIAL HYSTERECTOMY     • PATELLA SURGERY         Family History   Problem Relation Age of Onset   • Crohn's Disease Son    • Ulcerative Colitis Son        Social History  Remote device check.  Please see link for full device report.  Patient was informed of results and next follow up via mail.           Tobacco Use   • Smoking status: Current Every Day Smoker     Packs/day: 0.50     Years: 30.00     Pack years: 15.00     Types: Cigarettes   • Smokeless tobacco: Never Used   Substance Use Topics   • Alcohol use: Never   • Drug use: Never       Review of Systems   Constitutional: Positive for fever.   HENT: Negative for rhinorrhea.    Eyes: Negative for visual disturbance.   Respiratory: Negative for chest tightness and shortness of breath.    Cardiovascular: Negative for chest pain, palpitations and leg swelling.   Gastrointestinal: Positive for abdominal pain and nausea. Negative for diarrhea and vomiting.   Endocrine: Negative for polydipsia and polyuria.   Genitourinary: Negative for flank pain.   Musculoskeletal: Negative for back pain.   Skin: Negative for rash.   Allergic/Immunologic: Negative for immunocompromised state.   Neurological: Negative for speech difficulty and headaches.   Hematological: Does not bruise/bleed easily.   Psychiatric/Behavioral: Negative for confusion.       Physical Exam     ED Triage Vitals   ED Triage Vitals Group      Temp 09/25/21 1616 98.3 °F (36.8 °C)      Heart Rate 09/25/21 1617 93      Resp 09/25/21 1616 18      BP 09/25/21 1616 (!) 176/78      SpO2 09/25/21 1616 95 %      EtCO2 mmHg --       Height --       Weight --       Weight Scale Used --       BMI (Calculated) --       IBW/kg (Calculated) --        Physical Exam  Vitals and nursing note reviewed.   Constitutional:       Appearance: Normal appearance. She is normal weight.   HENT:      Head: Normocephalic and atraumatic.      Right Ear: External ear normal.      Left Ear: External ear normal.      Nose: Nose normal.      Mouth/Throat:      Mouth: Mucous membranes are moist.      Pharynx: Oropharynx is clear.   Eyes:      Extraocular Movements: Extraocular movements intact.      Conjunctiva/sclera: Conjunctivae normal.      Pupils: Pupils are equal, round, and reactive to light.   Cardiovascular:      Rate and  Rhythm: Normal rate and regular rhythm.      Pulses: Normal pulses.      Heart sounds: Normal heart sounds.   Pulmonary:      Effort: Pulmonary effort is normal.      Breath sounds: Normal breath sounds.   Abdominal:      General: Abdomen is flat.      Palpations: Abdomen is soft.      Tenderness: There is abdominal tenderness.      Comments: diffuse   Musculoskeletal:         General: Normal range of motion.      Cervical back: Normal range of motion and neck supple.   Skin:     General: Skin is warm and dry.      Capillary Refill: Capillary refill takes less than 2 seconds.   Neurological:      General: No focal deficit present.      Mental Status: She is alert and oriented to person, place, and time. Mental status is at baseline.   Psychiatric:         Mood and Affect: Mood normal.         Behavior: Behavior normal.         Thought Content: Thought content normal.         Judgment: Judgment normal.         ED Course     Procedures    Lab Results     Results for orders placed or performed during the hospital encounter of 09/26/21   Comprehensive Metabolic Panel   Result Value Ref Range    Fasting Status      Sodium 136 135 - 145 mmol/L    Potassium 4.4 3.4 - 5.1 mmol/L    Chloride 104 98 - 107 mmol/L    Carbon Dioxide 26 21 - 32 mmol/L    Anion Gap 10 10 - 20 mmol/L    Glucose 96 70 - 99 mg/dL    BUN 11 6 - 20 mg/dL    Creatinine 0.69 0.51 - 0.95 mg/dL    Glomerular Filtration Rate >90 >=60    BUN/ Creatinine Ratio 16 7 - 25    Calcium 9.8 8.4 - 10.2 mg/dL    Bilirubin, Total 0.5 0.2 - 1.0 mg/dL    GOT/AST 12 <=37 Units/L    GPT/ALT 26 <64 Units/L    Alkaline Phosphatase 89 45 - 117 Units/L    Albumin 3.8 3.6 - 5.1 g/dL    Protein, Total 7.4 6.4 - 8.2 g/dL    Globulin 3.6 2.0 - 4.0 g/dL    A/G Ratio 1.1 1.0 - 2.4   Troponin I Ultra Sensitive   Result Value Ref Range    Troponin I, Ultra Sensitive <0.02 <=0.04 ng/mL   CBC with Automated Differential (performable only)   Result Value Ref Range    WBC 8.6 4.2 - 11.0  K/mcL    RBC 5.64 (H) 4.00 - 5.20 mil/mcL    HGB 17.3 (H) 12.0 - 15.5 g/dL    HCT 50.5 (H) 36.0 - 46.5 %    MCV 89.5 78.0 - 100.0 fl    MCH 30.7 26.0 - 34.0 pg    MCHC 34.3 32.0 - 36.5 g/dL    RDW-CV 14.2 11.0 - 15.0 %    RDW-SD 46.4 39.0 - 50.0 fL     140 - 450 K/mcL    NRBC 0 <=0 /100 WBC    Neutrophil, Percent 82 %    Lymphocytes, Percent 13 %    Mono, Percent 5 %    Eosinophils, Percent 0 %    Basophils, Percent 0 %    Immature Granulocytes 0 %    Absolute Neutrophils 6.9 1.8 - 7.7 K/mcL    Absolute Lymphocytes 1.1 1.0 - 4.0 K/mcL    Absolute Monocytes 0.4 0.3 - 0.9 K/mcL    Absolute Eosinophils  0.0 0.0 - 0.5 K/mcL    Absolute Basophils 0.0 0.0 - 0.3 K/mcL    Absolute Immmature Granulocytes 0.0 0.0 - 0.2 K/mcL   Light Green Top   Result Value Ref Range    Extra Tube Hold for Add Ons    Lavender Top   Result Value Ref Range    Extra Tube Hold for Add Ons    Gold Top   Result Value Ref Range    Extra Tube Hold for Add Ons    Urinalysis & Reflex Microscopy With Culture If Indicated   Result Value Ref Range    COLOR, URINALYSIS Yellow     APPEARANCE, URINALYSIS Clear     GLUCOSE, URINALYSIS Negative Negative mg/dL    BILIRUBIN, URINALYSIS Negative Negative    KETONES, URINALYSIS 20  (A) Negative mg/dL    SPECIFIC GRAVITY, URINALYSIS 1.015 1.005 - 1.030    OCCULT BLOOD, URINALYSIS Small (A) Negative    PH, URINALYSIS 5.0 5.0 - 7.0    PROTEIN, URINALYSIS Negative Negative mg/dL    UROBILINOGEN, URINALYSIS 0.2 0.2, 1.0 mg/dL    NITRITE, URINALYSIS Negative Negative    LEUKOCYTE ESTERASE, URINALYSIS Negative Negative    SQUAMOUS EPITHELIAL, URINALYSIS 1 to 5 None Seen, 1 to 5 /hpf    ERYTHROCYTES, URINALYSIS 3 to 5 (A) None Seen, 1 to 2 /hpf    LEUKOCYTES, URINALYSIS 1 to 5 None Seen, 1 to 5 /hpf    BACTERIA, URINALYSIS Few (A) None Seen /hpf    HYALINE CASTS, URINALYSIS None Seen None Seen, 1 to 5 /lpf    MUCUS Present    Lipase   Result Value Ref Range    Lipase 117 73 - 393 Units/L       EKG Results     EKG  Interpretation  Rate: 86  Rhythm: NSR    EKG tracing interpreted by ED physician    Radiology Results     Imaging Results          XR CHEST PA AND LATERAL 2 VIEWS (Final result)  Result time 09/25/21 22:45:37    Final result                 Impression:    Emphysema.  No radiographic evidence of acute cardiopulmonary  process.    Electronically Signed by: MARY NAVAS MD   Signed on: 9/25/2021 10:45 PM                Narrative:    EXAM: XR CHEST PA AND LATERAL 2 VIEWS    CLINICAL INDICATION: Chest pain    COMPARISON: 07/01/2019.    FINDINGS: Emphysema noted.  Lung fields are grossly clear bilaterally.  No  definite pleural effusion or pneumothorax.  Heart size is within normal  limits.                                ED Medication Orders (From admission, onward)    Ordered Start     Status Ordering Provider    09/26/21 0030 09/26/21 0031  ondansetron (ZOFRAN) injection 4 mg  ONCE         Last MAR action: Given BREONNA URENA K    09/26/21 0030 09/26/21 0031  lactated ringers bolus 1,000 mL  ONCE         Last MAR action: New Bag BREONNA URENA K    09/26/21 0030 09/26/21 0031  alum-mag hydroxide+simethicone/lidocaine viscous (2:1) (MAGIC MOUTHWASH/GI COCKTAIL) (compounded) oral suspension 15 mL  ONCE         Last MAR action: Given ROMEL, BREONNA K    09/25/21 1621 09/25/21 1621  ACETAMINOPHEN 500 MG PO TABS Pyxis Override        Note to Pharmacy: Shantel Garner  : cabinet override    Ordered     09/25/21 1620 09/25/21 1621  acetaminophen (TYLENOL) tablet 1,000 mg  ONCE         Last MAR action: Given ROB KOO               [facility]  Number of Diagnoses or Management Options  Diagnosis management comments: 64 y/o female w/ fever, abd pain, will check labs, CT, reeval    .Teaching-Supervisory Addendum-Brief   I participated in the following activities of this patients care: the medical history, the physical exam, medical decision making, the procedure.     I personally performed: supervision of the patient's care, the  medical history, the physical exam, the medical decision making.     The case was discussed with: the resident    Procedures: I directly supervised any procedure(s) noted by resident    Evaluation and management service: I agree with the evaluation and management decisions made in this patient's care.                     Clinical Impression     No diagnosis found.    Disposition        There is no disposition no dispo time  There is no comment                     Evans Frazier MD  09/26/21 0242     oral

## 2022-12-15 NOTE — ED ADULT NURSE NOTE - NSIMPLEMENTINTERV_GEN_ALL_ED
Implemented All Universal Safety Interventions:  [place] to call system. Call bell, personal items and telephone within reach. Instruct patient to call for assistance. Room bathroom lighting operational. Non-slip footwear when patient is off stretcher. Physically safe environment: no spills, clutter or unnecessary equipment. Stretcher in lowest position, wheels locked, appropriate side rails in place. Implemented All Universal Safety Interventions:  [place] to call system. Call bell, personal items and telephone within reach. Instruct patient to call for assistance. Room bathroom lighting operational. Non-slip footwear when patient is off stretcher. Physically safe environment: no spills, clutter or unnecessary equipment. Stretcher in lowest position, wheels locked, appropriate side rails in place. Implemented All Universal Safety Interventions:  [place] to call system. Call bell, personal items and telephone within reach. Instruct patient to call for assistance. Room bathroom lighting operational. Non-slip footwear when patient is off stretcher. Physically safe environment: no spills, clutter or unnecessary equipment. Stretcher in lowest position, wheels locked, appropriate side rails in place.

## 2022-12-15 NOTE — ED PROVIDER NOTE - NSFOLLOWUPINSTRUCTIONS_ED_ALL_ED_FT
Follow up with your Primary Care Doctor within 7 days. If you do not have one, you can call the Internal Medicine office number below and make an appointment.    [facility]  Internal Medicine  [address] [place] [address]  Phone: [phone]  Fax: [phone]     Take motrin and/or tylenol as needed for fever or pain     Medications sent to the pharmacy for you. Take as directed. Sometimes the cough medicine is not covered by insurance, if that is the case you can either pay out of pocket for it or take over the counter cold medications such as DayQuil or NyQuil severe to treat it.    For sore throat you can use Cepacol Lozenges.     If you experience any new or worsening symptoms such as chest pain or difficulty breathing or if you are concerned you can always come back to the emergency for a re-evaluation. Follow up with your Primary Care Doctor within 7 days. If you do not have one, you can call the Internal Medicine office number below and make an appointment.    [facility]  Internal Medicine  [address] [place] [address]  Phone: [phone]  Fax: [phone]     Take motrin and/or tylenol as needed for fever or pain     Medications sent to the pharmacy for you. Take as directed. Sometimes the cough medicine is not covered by insurance, if that is the case you can either pay out of pocket for it or take over the counter cold medications such as DayQuil or NyQuil severe to treat it.    For sore throat you can use Cepacol Lozenges.     If you experience any new or worsening symptoms such as chest pain or difficulty breathing or if you are concerned you can always come back to the emergency for a re-evaluation. Follow up with your Primary Care Doctor within 7 days. If you do not have one, you can call the Internal Medicine office number below and make an appointment.    [facility]  Internal Medicine  [address] [place] [address]  Phone: [phone]  Fax: [phone]     Take motrin and/or tylenol as needed for fever or pain     Medications sent to the pharmacy for you. Take as directed. Sometimes the cough medicine is not covered by insurance, if that is the case you can either pay out of pocket for it or take over the counter cold medications such as DayQuil or NyQuil severe to treat it.    For sore throat you can use Cepacol Lozenges.     If you experience any new or worsening symptoms such as chest pain or difficulty breathing or if you are concerned you can always come back to the emergency for a re-evaluation.

## 2022-12-15 NOTE — ED PROVIDER NOTE - CLINICAL SUMMARY MEDICAL DECISION MAKING FREE TEXT BOX
27-year-old female with cough and reproducible chest pain likely secondary to costochondritis.  Well-appearing on exam. Will DC home with medications.

## 2022-12-15 NOTE — ED PROVIDER NOTE - PATIENT PORTAL LINK FT
You can access the FollowMyHealth Patient Portal offered by [facility] by registering at the following website: [email or web address]. By joining [employer]’s FollowMyHealth portal, you will also be able to view your health information using other applications (apps) compatible with our system. You can access the FollowMyHealth Patient Portal offered by [facility] by registering at the following website: [email or web address]. By joining [employer]’s FollowMyHealth portal, you will also be able to view your health information using other applications (apps) compatible with our system. You can access the FollowMyHealth Patient Portal offered by [facility] by registering at the following website: [email or web address]. By joining [employer]’s FollowMyHealth portal, you will also be able to view your health information using other applications (apps) compatible with our system.

## 2022-12-15 NOTE — ED PROVIDER NOTE - OBJECTIVE STATEMENT
27-year-old female with a past medical history of anxiety presents with cough and chest pain when she coughs x1 week. +sore throat. Reports the cough is worse at night time. She denies fevers, rhinorrhea, abdominal pain, nausea, vomiting, diarrhea, denies chest pain at rest.

## 2022-12-21 ENCOUNTER — APPOINTMENT (OUTPATIENT)
Dept: PAIN MANAGEMENT | Facility: CLINIC | Age: 27
End: 2022-12-21

## 2022-12-21 PROBLEM — Z00.00 ENCOUNTER FOR PREVENTIVE HEALTH EXAMINATION: Status: ACTIVE | Noted: 2022-12-21

## 2023-02-02 ENCOUNTER — APPOINTMENT (OUTPATIENT)
Dept: CARDIOLOGY | Facility: CLINIC | Age: 28
End: 2023-02-02

## 2023-02-02 NOTE — HISTORY OF PRESENT ILLNESS
[FreeTextEntry1] : SHAKIR JAMES is a 26 yo F PMH\par \par today she is referred for a cardiogenomic evaluation

## 2023-02-02 NOTE — REASON FOR VISIT
[FreeTextEntry3] : Dear            . I saw your patient SHAKIR JAMES on 02/02/2023 . Please see the note below for the assessment and plan. \par \par SHAKIR JAMES  was seen  for an initial consultation at the Cardiogen[facility] Program at [facility] on 02/02/2023.   Ms. JAMES was referred by  (NAME of referring DR) esther for hereditary cardiac predisposition risk assessment and counseling, due to( indicate reason for referral.)\par \par

## 2023-06-12 ENCOUNTER — APPOINTMENT (OUTPATIENT)
Dept: OBGYN | Facility: CLINIC | Age: 28
End: 2023-06-12
Payer: MEDICAID

## 2023-06-12 ENCOUNTER — ASOB RESULT (OUTPATIENT)
Age: 28
End: 2023-06-12

## 2023-06-12 VITALS
HEART RATE: 71 BPM | TEMPERATURE: 98.7 F | WEIGHT: 144 LBS | BODY MASS INDEX: 21.82 KG/M2 | HEIGHT: 68 IN | OXYGEN SATURATION: 98 % | RESPIRATION RATE: 16 BRPM | SYSTOLIC BLOOD PRESSURE: 106 MMHG | DIASTOLIC BLOOD PRESSURE: 70 MMHG

## 2023-06-12 DIAGNOSIS — Z01.419 ENCOUNTER FOR GYNECOLOGICAL EXAMINATION (GENERAL) (ROUTINE) W/OUT ABNORMAL FINDINGS: ICD-10-CM

## 2023-06-12 PROCEDURE — 76817 TRANSVAGINAL US OBSTETRIC: CPT

## 2023-06-12 PROCEDURE — 99385 PREV VISIT NEW AGE 18-39: CPT

## 2023-06-12 NOTE — PROCEDURE
[Transvaginal OB Sonogram] : Transvaginal OB Sonogram [Intrauterine Pregnancy] : intrauterine pregnancy [Yolk Sac] : yolk sac present [Fetal Heart] : fetal heart present [Date: ___] : Date: [unfilled] [Current GA by Sonogram: ___ (wks)] : Current GA by Sonogram: [unfilled]Uwks [___ day(s)] : [unfilled] days [Transvaginal OB Sonogram WNL] : Transvaginal OB Sonogram WNL [FreeTextEntry1] : Viable SIUP @ 10.1 WEEKS.

## 2023-06-12 NOTE — HISTORY OF PRESENT ILLNESS
[FreeTextEntry1] : SHAKIR JAMES 29 YO, presents for Annual & Delayed Menses\par G 1  \par LMP: 04/02/2023 - Regular Menses\par UCG Positive @ Home on 05/25/25\par Medication: PNV\par No family history of breast cancer. \par To do monthly SBE. .\par No health issues. \par Non smoker, no cats. \par No complaints. \par For Pelvic sonogram today.
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
## 2023-06-13 LAB
C TRACH RRNA SPEC QL NAA+PROBE: NOT DETECTED
N GONORRHOEA RRNA SPEC QL NAA+PROBE: NOT DETECTED
SOURCE TP AMPLIFICATION: NORMAL

## 2023-06-17 LAB — CYTOLOGY CVX/VAG DOC THIN PREP: ABNORMAL

## 2023-06-19 ENCOUNTER — APPOINTMENT (OUTPATIENT)
Dept: OBGYN | Facility: CLINIC | Age: 28
End: 2023-06-19
Payer: MEDICAID

## 2023-06-19 ENCOUNTER — ASOB RESULT (OUTPATIENT)
Age: 28
End: 2023-06-19

## 2023-06-19 VITALS
RESPIRATION RATE: 16 BRPM | OXYGEN SATURATION: 100 % | HEIGHT: 68 IN | TEMPERATURE: 98.7 F | SYSTOLIC BLOOD PRESSURE: 114 MMHG | WEIGHT: 142 LBS | BODY MASS INDEX: 21.52 KG/M2 | DIASTOLIC BLOOD PRESSURE: 74 MMHG | HEART RATE: 74 BPM

## 2023-06-19 DIAGNOSIS — Z71.7 HUMAN IMMUNODEFICIENCY VIRUS [HIV] COUNSELING: ICD-10-CM

## 2023-06-19 PROCEDURE — 76817 TRANSVAGINAL US OBSTETRIC: CPT

## 2023-06-19 PROCEDURE — 99214 OFFICE O/P EST MOD 30 MIN: CPT

## 2023-06-19 NOTE — PROCEDURE
[Transvaginal OB Sonogram] : Transvaginal OB Sonogram [Intrauterine Pregnancy] : intrauterine pregnancy [Yolk Sac] : yolk sac present [Fetal Heart] : fetal heart present [Date: ___] : Date: [unfilled] [Current GA by Sonogram: ___ (wks)] : Current GA by Sonogram: [unfilled]Uwks [___ day(s)] : [unfilled] days [Transvaginal OB Sonogram WNL] : Transvaginal OB Sonogram WNL [FreeTextEntry1] : Viable SIUP @ 11.1 weeks.

## 2023-06-19 NOTE — HISTORY OF PRESENT ILLNESS
[FreeTextEntry1] : SHAKIR JAMES 27 YO, presents for Sonogram & Blood Work\par G 1  \par LMP: 04/02/2022 - Regular  Menses\par Medication: PNV\par No complaints. \par Will draw blood for PNP & NIPS. \par HIV Counseling done. \par For FTS & NT @ [facility]- 6/26 to 7/7/23.

## 2023-06-20 LAB
ABO + RH PNL BLD: NORMAL
BLD GP AB SCN SERPL QL: NORMAL
ESTIMATED AVERAGE GLUCOSE: 111 MG/DL
GLUCOSE SERPL-MCNC: 89 MG/DL
HBA1C MFR BLD HPLC: 5.5 %
HBV SURFACE AG SER QL: NONREACTIVE
HCV AB SER QL: NONREACTIVE
HCV S/CO RATIO: 0.11 S/CO
HGB A MFR BLD: 97.3 %
HGB A2 MFR BLD: 2.7 %
HGB FRACT BLD-IMP: NORMAL
HIV1+2 AB SPEC QL IA.RAPID: NONREACTIVE
MEV IGG FLD QL IA: 139 AU/ML
MEV IGG+IGM SER-IMP: POSITIVE
RUBV IGG FLD-ACNC: 4.4 INDEX
RUBV IGG SER-IMP: POSITIVE
T PALLIDUM AB SER QL IA: NEGATIVE

## 2023-06-21 LAB — FMR1 GENE MUT ANL BLD/T: NORMAL

## 2023-06-22 LAB — AR GENE MUT ANL BLD/T: NORMAL

## 2023-06-29 ENCOUNTER — LABORATORY RESULT (OUTPATIENT)
Age: 28
End: 2023-06-29

## 2023-06-29 ENCOUNTER — ASOB RESULT (OUTPATIENT)
Age: 28
End: 2023-06-29

## 2023-06-29 ENCOUNTER — APPOINTMENT (OUTPATIENT)
Dept: ANTEPARTUM | Facility: CLINIC | Age: 28
End: 2023-06-29
Payer: MEDICAID

## 2023-06-29 ENCOUNTER — NON-APPOINTMENT (OUTPATIENT)
Age: 28
End: 2023-06-29

## 2023-06-29 PROCEDURE — 76813 OB US NUCHAL MEAS 1 GEST: CPT | Mod: 59

## 2023-06-29 PROCEDURE — 76801 OB US < 14 WKS SINGLE FETUS: CPT

## 2023-07-03 LAB — CFTR MUT TESTED BLD/T: NEGATIVE

## 2023-07-24 ENCOUNTER — APPOINTMENT (OUTPATIENT)
Dept: OBGYN | Facility: CLINIC | Age: 28
End: 2023-07-24
Payer: MEDICAID

## 2023-07-24 VITALS
RESPIRATION RATE: 16 BRPM | BODY MASS INDEX: 21.67 KG/M2 | TEMPERATURE: 100 F | HEIGHT: 68 IN | HEART RATE: 75 BPM | WEIGHT: 143 LBS | DIASTOLIC BLOOD PRESSURE: 74 MMHG | SYSTOLIC BLOOD PRESSURE: 112 MMHG | OXYGEN SATURATION: 99 %

## 2023-07-24 PROCEDURE — 0502F SUBSEQUENT PRENATAL CARE: CPT

## 2023-07-27 LAB
AFP MOM: 1.29
AFP VALUE: 46.5 NG/ML
ALPHA FETOPROTEIN SERUM COMMENT: NORMAL
ALPHA FETOPROTEIN SERUM INTERPRETATION: NORMAL
ALPHA FETOPROTEIN SERUM RESULTS: NORMAL
ALPHA FETOPROTEIN SERUM TEST RESULTS: NORMAL
GESTATIONAL AGE BASED ON: NORMAL
GESTATIONAL AGE ON COLLECTION DATE: 16.1 WEEKS
INSULIN DEP DIABETES: NO
MATERNAL AGE AT EDD AFP: 30.7 YR
MULTIPLE GESTATION: NO
OSBR RISK 1 IN: 4947
RACE: NORMAL
WEIGHT AFP: 143 LBS

## 2023-08-21 ENCOUNTER — NON-APPOINTMENT (OUTPATIENT)
Age: 28
End: 2023-08-21

## 2023-08-21 ENCOUNTER — APPOINTMENT (OUTPATIENT)
Dept: OBGYN | Facility: CLINIC | Age: 28
End: 2023-08-21
Payer: MEDICAID

## 2023-08-21 VITALS
HEIGHT: 68 IN | BODY MASS INDEX: 22.88 KG/M2 | RESPIRATION RATE: 16 BRPM | WEIGHT: 151 LBS | TEMPERATURE: 98 F | DIASTOLIC BLOOD PRESSURE: 72 MMHG | SYSTOLIC BLOOD PRESSURE: 112 MMHG | HEART RATE: 79 BPM | OXYGEN SATURATION: 96 %

## 2023-08-21 DIAGNOSIS — Z34.90 ENCOUNTER FOR SUPERVISION OF NORMAL PREGNANCY, UNSPECIFIED, UNSPECIFIED TRIMESTER: ICD-10-CM

## 2023-08-21 LAB
BILIRUB UR QL STRIP: NEGATIVE
CLARITY UR: CLEAR
COLLECTION METHOD: NORMAL
GLUCOSE UR-MCNC: NEGATIVE
HCG UR QL: 0.2 EU/DL
HGB UR QL STRIP.AUTO: NEGATIVE
KETONES UR-MCNC: NEGATIVE
LEUKOCYTE ESTERASE UR QL STRIP: NEGATIVE
NITRITE UR QL STRIP: NEGATIVE
PH UR STRIP: 7.5
PROT UR STRIP-MCNC: NEGATIVE
SP GR UR STRIP: 1.02

## 2023-08-21 PROCEDURE — 0502F SUBSEQUENT PRENATAL CARE: CPT

## 2023-08-21 PROCEDURE — 81002 URINALYSIS NONAUTO W/O SCOPE: CPT

## 2023-08-22 ENCOUNTER — ASOB RESULT (OUTPATIENT)
Age: 28
End: 2023-08-22

## 2023-08-22 ENCOUNTER — APPOINTMENT (OUTPATIENT)
Dept: ANTEPARTUM | Facility: CLINIC | Age: 28
End: 2023-08-22
Payer: MEDICAID

## 2023-08-22 PROCEDURE — 76805 OB US >/= 14 WKS SNGL FETUS: CPT

## 2023-09-18 ENCOUNTER — APPOINTMENT (OUTPATIENT)
Dept: OBGYN | Facility: CLINIC | Age: 28
End: 2023-09-18

## 2023-09-18 ENCOUNTER — NON-APPOINTMENT (OUTPATIENT)
Age: 28
End: 2023-09-18

## 2023-11-28 NOTE — PATIENT PROFILE ADULT. - NS PRO CONTRA REFUSE FLU INFO
Otc Regimen: sunscreen Detail Level: Detailed Risks/benefits discussed with patient or patient surrogate

## 2023-12-08 ENCOUNTER — OUTPATIENT (OUTPATIENT)
Dept: OUTPATIENT SERVICES | Facility: HOSPITAL | Age: 28
LOS: 1 days | End: 2023-12-08
Payer: MEDICAID

## 2023-12-08 DIAGNOSIS — O26.899 OTHER SPECIFIED PREGNANCY RELATED CONDITIONS, UNSPECIFIED TRIMESTER: ICD-10-CM

## 2023-12-08 PROCEDURE — G0463: CPT

## 2023-12-08 PROCEDURE — 99285 EMERGENCY DEPT VISIT HI MDM: CPT

## 2023-12-08 PROCEDURE — 59025 FETAL NON-STRESS TEST: CPT

## 2023-12-08 RX ORDER — FAMOTIDINE 10 MG/ML
20 INJECTION INTRAVENOUS ONCE
Refills: 0 | Status: COMPLETED | OUTPATIENT
Start: 2023-12-08 | End: 2023-12-08

## 2023-12-08 RX ADMIN — FAMOTIDINE 20 MILLIGRAM(S): 10 INJECTION INTRAVENOUS at 15:50

## 2023-12-11 DIAGNOSIS — R10.30 LOWER ABDOMINAL PAIN, UNSPECIFIED: ICD-10-CM

## 2023-12-11 DIAGNOSIS — Z3A.35 35 WEEKS GESTATION OF PREGNANCY: ICD-10-CM

## 2023-12-11 DIAGNOSIS — O26.893 OTHER SPECIFIED PREGNANCY RELATED CONDITIONS, THIRD TRIMESTER: ICD-10-CM

## 2024-03-28 ENCOUNTER — EMERGENCY (EMERGENCY)
Facility: HOSPITAL | Age: 29
LOS: 1 days | Discharge: HOME | End: 2024-03-28
Attending: EMERGENCY MEDICINE
Payer: MEDICAID

## 2024-03-28 VITALS
WEIGHT: 146.17 LBS | HEIGHT: 68 IN | TEMPERATURE: 100 F | SYSTOLIC BLOOD PRESSURE: 108 MMHG | DIASTOLIC BLOOD PRESSURE: 75 MMHG | RESPIRATION RATE: 18 BRPM | OXYGEN SATURATION: 98 % | HEART RATE: 104 BPM

## 2024-03-28 PROBLEM — F41.9 ANXIETY DISORDER, UNSPECIFIED: Chronic | Status: ACTIVE | Noted: 2022-12-15

## 2024-03-28 LAB
FLUAV AG NPH QL: SIGNIFICANT CHANGE UP
FLUBV AG NPH QL: DETECTED
SARS-COV-2 RNA SPEC QL NAA+PROBE: SIGNIFICANT CHANGE UP

## 2024-03-28 PROCEDURE — 99284 EMERGENCY DEPT VISIT MOD MDM: CPT

## 2024-03-28 PROCEDURE — 87637 SARSCOV2&INF A&B&RSV AMP PRB: CPT

## 2024-03-28 PROCEDURE — 87081 CULTURE SCREEN ONLY: CPT

## 2024-03-28 PROCEDURE — 99283 EMERGENCY DEPT VISIT LOW MDM: CPT

## 2024-03-28 RX ORDER — IBUPROFEN 200 MG
400 TABLET ORAL ONCE
Refills: 0 | Status: COMPLETED | OUTPATIENT
Start: 2024-03-28 | End: 2024-03-28

## 2024-03-28 RX ORDER — AZITHROMYCIN 500 MG/1
1 TABLET, FILM COATED ORAL
Qty: 6 | Refills: 0
Start: 2024-03-28 | End: 2024-04-01

## 2024-03-28 RX ADMIN — Medication 400 MILLIGRAM(S): at 12:01

## 2024-03-28 RX ADMIN — Medication 10 MILLIGRAM(S): at 12:01

## 2024-03-28 NOTE — ED ADULT TRIAGE NOTE - CHIEF COMPLAINT QUOTE
C/o sore throat, difficulty swallowing, and fever x 2 days. Denies SOB. Patient states ' I lost my voice and the pain in my throat started".

## 2024-03-28 NOTE — ED PROVIDER NOTE - PATIENT PORTAL LINK FT
You can access the FollowMyHealth Patient Portal offered by [facility] by registering at the following website: [email or web address]. By joining [facility]’s FollowMyHealth portal, you will also be able to view your health information using other applications (apps) compatible with our system.

## 2024-03-28 NOTE — ED ADULT TRIAGE NOTE - PATIENT ON (OXYGEN DELIVERY METHOD)
room air
Quality 265: Biopsy Follow-Up: Biopsy results reviewed, communicated, tracked, and documented
Detail Level: Zone

## 2024-03-28 NOTE — ED ADULT NURSE NOTE - OBJECTIVE STATEMENT
Patient presented to the ED complaining of sore throat, difficulty swallowing and fever for 2 days. No respiratory distress noted.

## 2024-03-30 LAB
CULTURE RESULTS: SIGNIFICANT CHANGE UP
SPECIMEN SOURCE: SIGNIFICANT CHANGE UP

## 2024-05-08 ENCOUNTER — APPOINTMENT (OUTPATIENT)
Dept: OBGYN | Facility: CLINIC | Age: 29
End: 2024-05-08

## 2024-06-20 ENCOUNTER — APPOINTMENT (OUTPATIENT)
Dept: OBGYN | Facility: CLINIC | Age: 29
End: 2024-06-20

## 2024-06-20 VITALS
HEIGHT: 68 IN | HEART RATE: 68 BPM | SYSTOLIC BLOOD PRESSURE: 96 MMHG | DIASTOLIC BLOOD PRESSURE: 94 MMHG | OXYGEN SATURATION: 97 % | BODY MASS INDEX: 21.82 KG/M2 | RESPIRATION RATE: 16 BRPM | WEIGHT: 144 LBS

## 2024-06-20 NOTE — HISTORY OF PRESENT ILLNESS
[FreeTextEntry1] : SHAKIR YUAN 28 YO, presents for Annual G 1 P 1001 -  on 23 @ Home Birth LMP: 24  Sexually active, not using contraceptive Medication: PNV No family history of breast cancer. To do monthly SBE.

## 2024-08-08 NOTE — ED PROVIDER NOTE - NEURO NEGATIVE STATEMENT, MLM
----- Message from Jayne Blanc MD sent at 8/8/2024  4:29 PM CDT -----  Your x-rays showed Significant degenerative (wear and tear) changes consistent with osteoarthritis of the R hip. Referral to ortho recommended.   
no loss of consciousness, no gait abnormality, no headache, no sensory deficits, and no weakness.